# Patient Record
Sex: MALE | Race: WHITE | Employment: UNEMPLOYED | URBAN - METROPOLITAN AREA
[De-identification: names, ages, dates, MRNs, and addresses within clinical notes are randomized per-mention and may not be internally consistent; named-entity substitution may affect disease eponyms.]

---

## 2017-05-03 ENCOUNTER — HOSPITAL ENCOUNTER (OUTPATIENT)
Age: 37
Discharge: HOME | End: 2017-05-03
Payer: MEDICAID

## 2017-05-03 VITALS
WEIGHT: 220 LBS | HEIGHT: 69 IN | TEMPERATURE: 99 F | BODY MASS INDEX: 32.58 KG/M2 | OXYGEN SATURATION: 97 % | RESPIRATION RATE: 18 BRPM | SYSTOLIC BLOOD PRESSURE: 134 MMHG | HEART RATE: 95 BPM | DIASTOLIC BLOOD PRESSURE: 90 MMHG

## 2017-05-03 DIAGNOSIS — J02.0 STREP PHARYNGITIS: Primary | ICD-10-CM

## 2017-05-03 PROCEDURE — 87430 STREP A AG IA: CPT

## 2017-05-03 PROCEDURE — 99213 OFFICE O/P EST LOW 20 MIN: CPT | Performed by: PHYSICIAN ASSISTANT

## 2017-05-03 PROCEDURE — 99214 OFFICE O/P EST MOD 30 MIN: CPT | Performed by: PHYSICIAN ASSISTANT

## 2017-05-03 RX ORDER — AMOXICILLIN 875 MG/1
875 TABLET, COATED ORAL 2 TIMES DAILY
Qty: 20 TABLET | Refills: 0 | Status: SHIPPED | OUTPATIENT
Start: 2017-05-03 | End: 2017-05-13

## 2017-05-03 NOTE — ED INITIAL ASSESSMENT (HPI)
REPORTS \"FLU LIKE SYMPTOMS\"  + DIARRHEA X 2 DAYS.  + GENERALIZED ABDOMINAL PAIN. REPORTS ABLE TO TAKE PO FLUIDS. PATIENT REPORTS URINATING WITHOUT DIFFICULTY. ALSO C/O LOWER BACK PAIN SINCE YESTERDAY STATES HE WAS LIFTING HEAVY OBJECTS AT WORK.   Major Becerril

## 2017-05-03 NOTE — ED PROVIDER NOTES
Patient Seen in: 5 [facility] [place]    History   Patient presents with:  Nausea/Vomiting/Diarrhea (gastrointestinal)    Stated Complaint: ABDOMINAL PAIN, DIARRHEA, COLD    HPI    Patient is a 72-year-old male with a history of dr mmHg   Pulse 05/03/17 1734 95   Resp 05/03/17 1734 18   Temp 05/03/17 1734 99 °F (37.2 °C)   Temp src 05/03/17 1734 Oral   SpO2 05/03/17 1734 97 %   O2 Device 05/03/17 1734 None (Room air)       Current:/90 mmHg  Pulse 95  Temp(Src) 99 °F (37.2 °C) ( appears diaphoretic. Physical exam as above. Tested + for strep. On reexam, patient is sitting on the bed with his girlfriend, talking and laughing, no distress, no longer diaphoretic.   Pt does not want to go to ER tonight for further evaluation as I re

## 2018-05-31 ENCOUNTER — HOSPITAL (OUTPATIENT)
Dept: OTHER | Age: 38
End: 2018-05-31
Attending: ORTHOPAEDIC SURGERY

## (undated) NOTE — ED AVS SNAPSHOT
[facility] AND [facility] in [address] [address] [address] [address]    Phone:  [phone]    Fax:  [phone]           Toyin Mead   MRN: [id number]    Department:  [facility] AND [facility] in [address] [place] [address]   Date of Visit: Discharge References/Attachments     PHARYNGITIS, STREP (CONFIRMED) (ENGLISH)      Disclosure     Insurance plans vary and the physician(s) referred by the Immediate Care may not be covered by your plan.   It is possible that the physician may not participa IF THERE IS ANY CHANGE OR WORSENING OF YOUR CONDITION, CALL YOUR PRIMARY CARE PHYSICIAN AT ONCE OR GO TO THE EMERGENCY DEPARTMENT.     If you have been prescribed any medication(s), please fill your prescription right away and begin taking the medication(s) you to explore options for quitting.     - If you have concerns related to behavioral health issues or thoughts of harming yourself, contact [address] [place] [address] at [phone].     - If you don’t have insurance, Johanna Mccullough

## (undated) NOTE — LETTER
[facility] [place] [address] [address]  Dept: [phone]  Dept Fax: [phone]  Loc: [phone]      May 3, 2017    Patient: Venkat Koroma   Date of Visit: 5/3/2017       To Whom It May Concern:    Karla Wagner